# Patient Record
Sex: FEMALE | Race: WHITE | NOT HISPANIC OR LATINO | URBAN - NONMETROPOLITAN AREA
[De-identification: names, ages, dates, MRNs, and addresses within clinical notes are randomized per-mention and may not be internally consistent; named-entity substitution may affect disease eponyms.]

---

## 2018-10-24 ENCOUNTER — APPOINTMENT (RX ONLY)
Dept: URBAN - NONMETROPOLITAN AREA CLINIC 13 | Facility: CLINIC | Age: 73
Setting detail: DERMATOLOGY
End: 2018-10-24

## 2018-10-24 DIAGNOSIS — Z41.9 ENCOUNTER FOR PROCEDURE FOR PURPOSES OTHER THAN REMEDYING HEALTH STATE, UNSPECIFIED: ICD-10-CM

## 2018-10-24 PROCEDURE — ? ADDITIONAL NOTES

## 2018-10-24 NOTE — PROCEDURE: ADDITIONAL NOTES
Detail Level: Simple
Additional Notes: patient presents as new patient to the spa.  she is concerned about deep wrinkles to her forehead, crow's feet, and lower face.  she also has skin laxity.  she may have some benefit from botox, particularly to her forehead, but her creases are deep and she would likely have minimal benefit and would not likely see results until she has had multiple treatments.  dermal fillers may be an option to the lower face.  she and I discussed skin resurfacing with DOT CO2 laser, and I suspect she would reap the most benefit from that procedure (and come closer to addressing her concerns).  I reviewed this in detail with her today. she is given printed information to review, and she will consider this procedure and discuss with her .  she will return for specific DOT consultation if she decides to proceed.  $75 consultation fee paid today.  samples of skin care products also dispensed.

## 2021-06-14 ENCOUNTER — APPOINTMENT (RX ONLY)
Dept: URBAN - NONMETROPOLITAN AREA CLINIC 13 | Facility: CLINIC | Age: 76
Setting detail: DERMATOLOGY
End: 2021-06-14

## 2021-06-14 VITALS — WEIGHT: 165 LBS | HEIGHT: 66 IN

## 2021-06-14 DIAGNOSIS — L82.1 OTHER SEBORRHEIC KERATOSIS: ICD-10-CM

## 2021-06-14 DIAGNOSIS — L57.8 OTHER SKIN CHANGES DUE TO CHRONIC EXPOSURE TO NONIONIZING RADIATION: ICD-10-CM

## 2021-06-14 DIAGNOSIS — D22 MELANOCYTIC NEVI: ICD-10-CM

## 2021-06-14 DIAGNOSIS — D18.0 HEMANGIOMA: ICD-10-CM

## 2021-06-14 DIAGNOSIS — L57.0 ACTINIC KERATOSIS: ICD-10-CM

## 2021-06-14 PROBLEM — D22.5 MELANOCYTIC NEVI OF TRUNK: Status: ACTIVE | Noted: 2021-06-14

## 2021-06-14 PROBLEM — D18.01 HEMANGIOMA OF SKIN AND SUBCUTANEOUS TISSUE: Status: ACTIVE | Noted: 2021-06-14

## 2021-06-14 PROCEDURE — ? SUNSCREEN RECOMMENDATIONS

## 2021-06-14 PROCEDURE — 99203 OFFICE O/P NEW LOW 30 MIN: CPT | Mod: 25

## 2021-06-14 PROCEDURE — ? COUNSELING

## 2021-06-14 PROCEDURE — ? LIQUID NITROGEN

## 2021-06-14 PROCEDURE — ? ADDITIONAL NOTES

## 2021-06-14 PROCEDURE — 17000 DESTRUCT PREMALG LESION: CPT

## 2021-06-14 ASSESSMENT — LOCATION ZONE DERM
LOCATION ZONE: LEG
LOCATION ZONE: FACE
LOCATION ZONE: TRUNK

## 2021-06-14 ASSESSMENT — LOCATION SIMPLE DESCRIPTION DERM
LOCATION SIMPLE: ABDOMEN
LOCATION SIMPLE: RIGHT THIGH
LOCATION SIMPLE: LEFT CHEEK
LOCATION SIMPLE: LEFT UPPER BACK
LOCATION SIMPLE: RIGHT UPPER BACK

## 2021-06-14 ASSESSMENT — LOCATION DETAILED DESCRIPTION DERM
LOCATION DETAILED: LEFT RIB CAGE
LOCATION DETAILED: RIGHT ANTERIOR PROXIMAL THIGH
LOCATION DETAILED: LEFT MID-UPPER BACK
LOCATION DETAILED: LEFT SUPERIOR LATERAL BUCCAL CHEEK
LOCATION DETAILED: LEFT CENTRAL MALAR CHEEK
LOCATION DETAILED: RIGHT MID-UPPER BACK

## 2024-03-14 ENCOUNTER — APPOINTMENT (RX ONLY)
Dept: URBAN - NONMETROPOLITAN AREA CLINIC 13 | Facility: CLINIC | Age: 79
Setting detail: DERMATOLOGY
End: 2024-03-14

## 2024-03-14 DIAGNOSIS — L81.4 OTHER MELANIN HYPERPIGMENTATION: ICD-10-CM

## 2024-03-14 PROCEDURE — 99212 OFFICE O/P EST SF 10 MIN: CPT

## 2024-03-14 PROCEDURE — ? COUNSELING

## 2024-03-14 PROCEDURE — ? RECOMMENDATIONS

## 2024-03-14 ASSESSMENT — LOCATION SIMPLE DESCRIPTION DERM
LOCATION SIMPLE: LEFT CHEEK
LOCATION SIMPLE: RIGHT CHEEK

## 2024-03-14 ASSESSMENT — LOCATION DETAILED DESCRIPTION DERM
LOCATION DETAILED: RIGHT CENTRAL MALAR CHEEK
LOCATION DETAILED: LEFT CENTRAL MALAR CHEEK

## 2024-03-14 ASSESSMENT — LOCATION ZONE DERM: LOCATION ZONE: FACE

## 2024-03-14 NOTE — PROCEDURE: RECOMMENDATIONS
Recommendation Preamble: The following recommendations were made during the visit: [facility]
Render Risk Assessment In Note?: no
Detail Level: Zone

## 2024-04-04 ENCOUNTER — RX ONLY (OUTPATIENT)
Age: 79
Setting detail: RX ONLY
End: 2024-04-04

## 2024-04-04 ENCOUNTER — APPOINTMENT (RX ONLY)
Dept: URBAN - NONMETROPOLITAN AREA CLINIC 13 | Facility: CLINIC | Age: 79
Setting detail: DERMATOLOGY
End: 2024-04-04

## 2024-04-04 DIAGNOSIS — Z41.9 ENCOUNTER FOR PROCEDURE FOR PURPOSES OTHER THAN REMEDYING HEALTH STATE, UNSPECIFIED: ICD-10-CM

## 2024-04-04 PROCEDURE — ? COSMETIC CONSULTATION: FRACTIONAL RESURFACING

## 2024-04-04 PROCEDURE — ? COSMETIC CONSULTATION: Q SWITCHED LASER

## 2024-04-04 PROCEDURE — ? COSMETIC CONSULTATION: BBL

## 2024-04-04 PROCEDURE — ? COSMETIC CONSULTATION: FILLERS

## 2024-04-04 RX ORDER — TRETIONIN 0.25 MG/G
CREAM TOPICAL
Qty: 45 | Refills: 5 | Status: ERX | COMMUNITY
Start: 2024-04-04

## 2024-04-04 RX ORDER — VALACYCLOVIR 1 G/1
TABLET, FILM COATED ORAL
Qty: 12 | Refills: 1 | Status: ERX | COMMUNITY
Start: 2024-04-04

## 2024-04-23 ENCOUNTER — APPOINTMENT (RX ONLY)
Dept: URBAN - NONMETROPOLITAN AREA CLINIC 13 | Facility: CLINIC | Age: 79
Setting detail: DERMATOLOGY
End: 2024-04-23

## 2024-04-23 DIAGNOSIS — Z41.9 ENCOUNTER FOR PROCEDURE FOR PURPOSES OTHER THAN REMEDYING HEALTH STATE, UNSPECIFIED: ICD-10-CM

## 2024-04-23 PROCEDURE — ? FRAXEL

## 2024-04-23 NOTE — PROCEDURE: FRAXEL
Treatment Level: 4
Wavelength: 1927nm
Location: Use Location Override
Large Metal Eye Shield Text: The ocular mucosa was anesthetized with tetracaine. Once adequate anesthesia was optained, large metal eye shields were inserted and remained in place until the procedure was completed.
Energy(Mj/Cm2): 10
Length Of Topical Anesthesia Application (Optional): 60 minutes
Tip: 15mm
Detail Level: Zone
Location: full face except eyelids
External Cooling Fan Speed: 6
Energy(Mj/Cm2): 25
Was An Eye Shield Used?: No
Price (Use Numbers Only, No Special Characters Or $): 3851
Treatment Level: 3
Number Of Passes: 8
Energy(Mj/Cm2): 1
Energy(Mj/Cm2): 50
Wavelength: 1550nm
Consent: Electronic consent obtained, risks reviewed including but not limited to pain and incomplete improvement.
Medium Plastic Eye Shield Text: The ocular mucosa was anesthetized with tetracaine. Once adequate anesthesia was optained, medium plastic eye shields were inserted and remained in place until the procedure was completed.
Location: neck
Add Post-Care Below To The Note: Yes
Indication: photodamage
Anesthesia Type: 1% lidocaine with epinephrine
Small Metal Eye Shield Text: The ocular mucosa was anesthetized with tetracaine. Once adequate anesthesia was optained, small metal eye shields were inserted and remained in place until the procedure was completed.
Small Plastic Eye Shield Text: The ocular mucosa was anesthetized with tetracaine. Once adequate anesthesia was optained, small plastic eye shields were inserted and remained in place until the procedure was completed.
Location: decolletage of the chest
Energy(Mj/Cm2): 5
Large Plastic Eye Shield Text: The ocular mucosa was anesthetized with tetracaine. Once adequate anesthesia was optained, large plastic eye shields were inserted and remained in place until the procedure was completed.
Medium Metal Eye Shield Text: The ocular mucosa was anesthetized with proparacaine. Once adequate anesthesia was obtained, medium metal eye shields were inserted and remained in place until the procedure was completed.
Post-Care Instructions: I reviewed with the patient in detail post-care instructions. Patient should avoid sun until area fully healed.\\n\\nIce packs provided.
Treatment Level: 2
Topical Anesthesia Type: 23% lidocaine, 7% tetracaine
Energy(Mj/Cm2): 40

## 2024-05-30 ENCOUNTER — APPOINTMENT (RX ONLY)
Dept: URBAN - NONMETROPOLITAN AREA CLINIC 13 | Facility: CLINIC | Age: 79
Setting detail: DERMATOLOGY
End: 2024-05-30

## 2024-05-30 DIAGNOSIS — Z41.9 ENCOUNTER FOR PROCEDURE FOR PURPOSES OTHER THAN REMEDYING HEALTH STATE, UNSPECIFIED: ICD-10-CM

## 2024-05-30 PROCEDURE — ? JUVEDERM VOLUMA XC INJECTION

## 2024-05-30 ASSESSMENT — LOCATION DETAILED DESCRIPTION DERM
LOCATION DETAILED: RIGHT CENTRAL MALAR CHEEK
LOCATION DETAILED: RIGHT SUPERIOR LATERAL MALAR CHEEK
LOCATION DETAILED: LEFT SUPERIOR LATERAL MALAR CHEEK
LOCATION DETAILED: LEFT MEDIAL MALAR CHEEK
LOCATION DETAILED: RIGHT INFERIOR CENTRAL MALAR CHEEK
LOCATION DETAILED: LEFT SUPERIOR CENTRAL MALAR CHEEK
LOCATION DETAILED: RIGHT SUPERIOR CENTRAL MALAR CHEEK
LOCATION DETAILED: LEFT CENTRAL MALAR CHEEK

## 2024-05-30 ASSESSMENT — LOCATION SIMPLE DESCRIPTION DERM
LOCATION SIMPLE: RIGHT CHEEK
LOCATION SIMPLE: LEFT CHEEK

## 2024-05-30 ASSESSMENT — LOCATION ZONE DERM: LOCATION ZONE: FACE

## 2024-05-30 NOTE — PROCEDURE: JUVEDERM VOLUMA XC INJECTION
Additional Area 3 Location: Bilateral pre jowl
Include Cannula Information In Note?: Yes
Additional Anesthesia Volume In Cc: 6
Vermilion Lips Filler Volume In Cc: 0
Include Cannula Size?: 25G
Number Of Syringes (Required For Inventory): 1
Expiration Date (Month Year): 01-
Consent: Electronic consent obtained through [facility]. Risks include but not limited to bruising, beading, irregular texture, ulceration, infection, allergic reaction, scar formation, incomplete augmentation, temporary nature, procedural pain.
Procedural Text: The filler was administered to the treatment areas noted above.\\n\\nPhotos taken before and after injections.
Price (Use Numbers Only, No Special Characters Or $): 8135
Cheeks Filler Volume In Cc: 2
Additional Area 2 Location: Right Lateral cheek
Include Cannula Length?: 1.5 inch
Additional Area 4 Location: Right jawline
Lot #: [id number]
Filler: Juvederm Voluma XC
Detail Level: Detailed
Use Map Statement For Sites (Optional): No
Post-Care Instructions: Patient instructed to avoid icing cheeks to prevent product from moving.
Additional Area 1 Location: Chin
Map Statment: See Attach Map for Complete Details

## 2024-06-18 ENCOUNTER — APPOINTMENT (RX ONLY)
Dept: URBAN - NONMETROPOLITAN AREA CLINIC 13 | Facility: CLINIC | Age: 79
Setting detail: DERMATOLOGY
End: 2024-06-18

## 2024-06-18 DIAGNOSIS — Z41.9 ENCOUNTER FOR PROCEDURE FOR PURPOSES OTHER THAN REMEDYING HEALTH STATE, UNSPECIFIED: ICD-10-CM

## 2024-06-18 PROCEDURE — ? JUVEDERM VOLUMA XC INJECTION

## 2024-06-18 NOTE — PROCEDURE: JUVEDERM VOLUMA XC INJECTION
Nasolabial Folds Filler Volume In Cc: 0
Lateral Face Filler Volume In Cc: 1
Include Cannula Size?: 25G
Additional Area 3 Location: Bilateral pre jowl
Detail Level: Detailed
Use Map Statement For Sites (Optional): No
Consent: Electronic consent obtained through [employer]. Risks include but not limited to bruising, beading, irregular texture, ulceration, infection, allergic reaction, scar formation, incomplete augmentation, temporary nature, procedural pain.
Expiration Date (Month Year): 01-, 09-
Post-Care Instructions: Patient instructed to avoid icing cheeks to prevent product from moving.
Include Cannula Information In Note?: Yes
Additional Anesthesia Volume In Cc: 6
Additional Area 1 Location: Chin
Cheeks Filler Volume In Cc: 2
Additional Area 4 Location: Right jawline
Include Cannula Length?: 1.5 inch
Lot #: [id number], [id number]
Price (Use Numbers Only, No Special Characters Or $): 0290
Additional Area 2 Location: Right Lateral cheek
Map Statment: See Attach Map for Complete Details
Procedural Text: The filler was administered to the treatment areas noted above.\\n\\nPhotos taken before and after injections.
Filler: Juvederm Voluma XC

## 2024-09-25 ENCOUNTER — APPOINTMENT (RX ONLY)
Dept: URBAN - NONMETROPOLITAN AREA CLINIC 13 | Facility: CLINIC | Age: 79
Setting detail: DERMATOLOGY
End: 2024-09-25

## 2024-09-25 DIAGNOSIS — Z41.9 ENCOUNTER FOR PROCEDURE FOR PURPOSES OTHER THAN REMEDYING HEALTH STATE, UNSPECIFIED: ICD-10-CM

## 2024-09-25 PROCEDURE — ? JUVEDERM ULTRA XC INJECTION

## 2024-09-25 PROCEDURE — ? RESTYLANE LYFT INJECTION

## 2024-09-25 NOTE — PROCEDURE: RESTYLANE LYFT INJECTION
Anesthesia Volume In Cc: 0
Expiration Date (Month Year): 03-
Procedural Text: The filler was administered to the treatment areas noted above.\\n\\nPhotos taken prior to injections.
Detail Level: Detailed
Use Map Statement For Sites (Optional): No
Cheeks Filler Volume In Cc: 2
Post-Care Instructions: Patient instructed to apply ice to reduce swelling.
Consent: Electronic consent obtained through [employer]. Risks include but not limited to bruising, beading, irregular texture, ulceration, infection, allergic reaction, scar formation, incomplete augmentation, temporary nature, procedural pain.
Include Cannula Size?: 25G
Number Of Syringes (Required For Inventory): 1
Filler: Nydia Kelly
Lot #: [id number]
Map Statement: See Attach Map for Complete Details
Additional Anesthesia Type: 1% lidocaine with epinephrine
Include Cannula Length?: 1.5 inch
Include Cannula Brand?: SOTO
Price (Use Numbers Only, No Special Characters Or $): 1775

## 2024-09-25 NOTE — PROCEDURE: JUVEDERM ULTRA XC INJECTION
Additional Area 2 Volume In Cc: 0
Additional Anesthesia Volume In Cc: 6
Nasolabial Folds Filler Volume In Cc: 0.5
Consent: Electronic consent obtained through [employer]. Risks include but not limited to bruising, beading, irregular texture, ulceration, infection, allergic reaction, scar formation, incomplete augmentation, temporary nature, procedural pain.
Procedural Text: The filler was administered to the treatment areas noted above.\\n\\nPhotos taken prior to injections and post injections.\\n\\nFiller administered per written protocol per Nichole Birch MD.
Post-Care Instructions: Patient instructed to apply ice to reduce swelling and arnica gel to minimize bruising.\\n\\nPatient advised to come back in 2 weeks if any concerns develop.
Map Statment: See Attach Map for Complete Details
Price (Use Numbers Only, No Special Characters Or $): 895
Expiration Date (Month Year): 12-28-25
Number Of Syringes (Required For Inventory): 1
Include Cannula Information In Note?: No
Additional Area 1 Location: Lower vermillion lip
Lot #: [id number]
Filler: Juvederm Ultra XC
Detail Level: Detailed

## 2024-09-26 ENCOUNTER — RX ONLY (OUTPATIENT)
Age: 79
Setting detail: RX ONLY
End: 2024-09-26

## 2024-09-26 RX ORDER — TRETIONIN 0.5 MG/G
CREAM TOPICAL
Qty: 45 | Refills: 5 | Status: ERX | COMMUNITY
Start: 2024-09-26

## 2025-07-25 ENCOUNTER — APPOINTMENT (OUTPATIENT)
Dept: URBAN - NONMETROPOLITAN AREA CLINIC 13 | Facility: CLINIC | Age: 80
Setting detail: DERMATOLOGY
End: 2025-07-25

## 2025-07-25 DIAGNOSIS — Z41.9 ENCOUNTER FOR PROCEDURE FOR PURPOSES OTHER THAN REMEDYING HEALTH STATE, UNSPECIFIED: ICD-10-CM

## 2025-07-25 PROCEDURE — ? JUVEDERM VOLUX XC INJECTION

## 2025-07-25 PROCEDURE — ? JUVEDERM VOLUMA XC INJECTION

## 2025-07-25 PROCEDURE — ? RESTYLANE DEFYNE INJECTION

## 2025-07-25 NOTE — PROCEDURE: RESTYLANE DEFYNE INJECTION
Additional Area 2 Volume In Cc: 0
Detail Level: Detailed
Nasolabial Folds Filler Volume In Cc: 0.4
Filler: Restylane Defyne
Expiration Date (Month Year): 03-
Procedural Text: The filler was administered to the treatment areas noted above.
Use Map Statement For Sites (Optional): No
Consent: Written consent obtained. Risks include but not limited to bruising, beading, irregular texture, ulceration, infection, allergic reaction, scar formation, incomplete augmentation, temporary nature, procedural pain.
Additional Area 1 Location: Chin
Number Of Syringes (Required For Inventory): 1
Lot #: [id number]
Post-Care Instructions: Patient instructed to apply ice to reduce swelling.
Map Statement: See Attached Map for Complete Details
Cheeks Filler Volume In Cc: 0.2
Price (Use Numbers Only, No Special Characters Or $): 466

## 2025-07-25 NOTE — PROCEDURE: JUVEDERM VOLUX XC INJECTION
Procedural Text: The filler was administered to the treatment areas noted above.
Use Map Statement For Sites (Optional): No
Additional Area 3 Location: Right earlobe
Lateral Face Filler Volume In Cc: 0
Detail Level: Detailed
Additional Anesthesia Volume In Cc: 6
Include Cannula Length?: 1.5 inch
Anesthesia Volume In Cc: 2
Additional Area 1 Location: Chin
Post-Care Instructions: Patient instructed to apply ice to reduce swelling.
Expiration Date (Month Year): 08-
Include Cannula Information In Note?: Yes
Consent: Written consent obtained. Risks include but not limited to bruising, beading, irregular texture, ulceration, infection, allergic reaction, scar formation, incomplete augmentation, temporary nature, procedural pain.
Include Cannula Size?: 25G
Filler: Juvederm Volux XC
Map Statment: See Attach Map for Complete Details
Lot #: [id number]
Price (Use Numbers Only, No Special Characters Or $): 253

## 2025-07-25 NOTE — PROCEDURE: JUVEDERM VOLUMA XC INJECTION
Additional Area 1 Volume In Cc: 0
Cheeks Filler Volume In Cc: 2
Price (Use Numbers Only, No Special Characters Or $): 5463
Map Statment: See Attach Map for Complete Details
Include Cannula Information In Note?: Yes
Include Cannula Size?: 25G
Number Of Syringes (Required For Inventory): 3
Consent: Electronic consent obtained through [employer]. Risks include but not limited to bruising, beading, irregular texture, ulceration, infection, allergic reaction, scar formation, incomplete augmentation, temporary nature, procedural pain.
Detail Level: Detailed
Filler: Juvederm Voluma XC
Use Map Statement For Sites (Optional): No
Additional Area 2 Location: Bilateral submalar lower cheek
Additional Area 4 Location: Right jawline
Lot #: [id number]
Post-Care Instructions: Patient instructed to avoid icing cheeks to prevent product from moving.
Include Cannula Length?: 1.5 inch
Include Cannula Brand?: SOTO
Additional Area 1 Location: Chin
Additional Area 3 Location: Bilateral pre jowl
Expiration Date (Month Year): 07-, 06-, 07-
Lateral Face Filler Volume In Cc: 1
Additional Anesthesia Volume In Cc: 6
Procedural Text: The filler was administered to the treatment areas noted above.\\n\\nPhotos taken before and after injections.

## 2025-08-07 ENCOUNTER — APPOINTMENT (OUTPATIENT)
Dept: URBAN - NONMETROPOLITAN AREA CLINIC 13 | Facility: CLINIC | Age: 80
Setting detail: DERMATOLOGY
End: 2025-08-07

## 2025-08-07 DIAGNOSIS — Z41.9 ENCOUNTER FOR PROCEDURE FOR PURPOSES OTHER THAN REMEDYING HEALTH STATE, UNSPECIFIED: ICD-10-CM

## 2025-08-07 PROCEDURE — ? COSMETIC FOLLOW-UP
